# Patient Record
Sex: FEMALE
[De-identification: names, ages, dates, MRNs, and addresses within clinical notes are randomized per-mention and may not be internally consistent; named-entity substitution may affect disease eponyms.]

---

## 2017-04-20 ENCOUNTER — HOSPITAL ENCOUNTER (INPATIENT)
Dept: HOSPITAL 14 - H.ER | Age: 27
LOS: 1 days | Discharge: HOME | DRG: 883 | End: 2017-04-21
Attending: GENERAL ACUTE CARE HOSPITAL | Admitting: GENERAL ACUTE CARE HOSPITAL
Payer: COMMERCIAL

## 2017-04-20 DIAGNOSIS — K35.80: Primary | ICD-10-CM

## 2017-04-20 LAB
ALBUMIN/GLOB SERPL: 1.2 {RATIO} (ref 1–2.1)
ALP SERPL-CCNC: 70 U/L (ref 38–126)
ALT SERPL-CCNC: 68 U/L (ref 9–52)
AST SERPL-CCNC: 45 U/L (ref 14–36)
BASOPHILS # BLD AUTO: 0 K/UL (ref 0–0.2)
BASOPHILS NFR BLD: 0.2 % (ref 0–2)
BILIRUB SERPL-MCNC: 0.8 MG/DL (ref 0.2–1.3)
BUN SERPL-MCNC: 12 MG/DL (ref 7–17)
CALCIUM SERPL-MCNC: 9.3 MG/DL (ref 8.4–10.2)
CHLORIDE SERPL-SCNC: 104 MMOL/L (ref 98–107)
CO2 SERPL-SCNC: 25 MMOL/L (ref 22–30)
EOSINOPHIL # BLD AUTO: 0.1 K/UL (ref 0–0.7)
EOSINOPHIL NFR BLD: 1 % (ref 0–4)
ERYTHROCYTE [DISTWIDTH] IN BLOOD BY AUTOMATED COUNT: 13.1 % (ref 11.5–14.5)
GLOBULIN SER-MCNC: 3.5 GM/DL (ref 2.2–3.9)
GLUCOSE SERPL-MCNC: 79 MG/DL (ref 65–105)
HCT VFR BLD CALC: 38.1 % (ref 34–47)
LYMPHOCYTES # BLD AUTO: 2.2 K/UL (ref 1–4.3)
LYMPHOCYTES NFR BLD AUTO: 19.9 % (ref 20–40)
MCH RBC QN AUTO: 30.3 PG (ref 27–31)
MCHC RBC AUTO-ENTMCNC: 33.5 G/DL (ref 33–37)
MCV RBC AUTO: 90.5 FL (ref 81–99)
MONOCYTES # BLD: 0.8 K/UL (ref 0–0.8)
MONOCYTES NFR BLD: 7.4 % (ref 0–10)
NEUTROPHILS # BLD: 7.7 K/UL (ref 1.8–7)
NEUTROPHILS NFR BLD AUTO: 71.5 % (ref 50–75)
NRBC BLD AUTO-RTO: 0 % (ref 0–0)
PLATELET # BLD: 187 K/UL (ref 130–400)
PMV BLD AUTO: 8.1 FL (ref 7.2–11.7)
POTASSIUM SERPL-SCNC: 4.3 MMOL/L (ref 3.6–5)
PROT SERPL-MCNC: 7.8 G/DL (ref 6.3–8.2)
SODIUM SERPL-SCNC: 143 MMOL/L (ref 132–148)
WBC # BLD AUTO: 10.8 K/UL (ref 4.8–10.8)

## 2017-04-20 PROCEDURE — 0DTJ4ZZ RESECTION OF APPENDIX, PERCUTANEOUS ENDOSCOPIC APPROACH: ICD-10-PCS | Performed by: SURGERY

## 2017-04-20 RX ADMIN — WATER SCH MLS/HR: 1 INJECTION INTRAMUSCULAR; INTRAVENOUS; SUBCUTANEOUS at 22:03

## 2017-04-20 NOTE — CP.PCM.CON
<César Evans - Last Filed: 04/20/17 17:49>





History of Present Illness





- History of Present Illness


History of Present Illness: 


General Surgery Consult


Re: acute appendicitis





HPI: 27F presented to the ED C/O suprapubic abd pain that began today at 5AM. 

Pain continued to get worse through the day so she decided to come to ED. Had 

chills last night. Decreased appetite. Denies fevers, N/V/D. Last BM was 

yesterday and was normal. LMP 3/30/17. Currently c/o suprapubic/R sided pain.





PMH: Denies


PSH: L inguinal hernia as a child


SH: Occasional EtOH, no tobacco or EtOH use


All: NKDA


Meds: Denies








Review of Systems





- Review of Systems


All systems: reviewed and no additional remarkable complaints except (as per HPI

)





Past Patient History





- Past Social History


Smoking Status: Never Smoked





- PSYCHIATRIC


Hx Substance Use: No





- SURGICAL HISTORY


Hx Surgeries: No





- ANESTHESIA


Hx Anesthesia: No





Meds


Allergies/Adverse Reactions: 


 Allergies











Allergy/AdvReac Type Severity Reaction Status Date / Time


 


No Known Allergies Allergy   Verified 04/20/17 12:19














Physical Exam





- Constitutional


Appears: Non-toxic, No Acute Distress





- Head Exam


Head Exam: ATRAUMATIC, NORMOCEPHALIC





- Eye Exam


Eye Exam: EOMI.  absent: Scleral icterus





- ENT Exam


ENT Exam: Mucous Membranes Dry


Additional comments: 


trachea midline





- Respiratory Exam


Respiratory Exam: Respiratory Distress, NORMAL BREATHING PATTERN





- Cardiovascular Exam


Cardiovascular Exam: RRR, +S1





- GI/Abdominal Exam


GI & Abdominal Exam: Guarding, Soft, Tenderness (suprapubic > lower quadrants).

  absent: Distended, Firm, Rigid


Additional comments: 


well healed scar in L inguinal region





- Rectal Exam


Rectal Exam: Deferred





- Extremities Exam


Extremities exam: Positive for: normal capillary refill, pedal pulses present.  

Negative for: calf tenderness





- Back Exam


Back exam: absent: CVA tenderness (L), CVA tenderness (R)





- Neurological Exam


Neurological exam: Alert, Oriented x3





- Psychiatric Exam


Psychiatric exam: Normal Affect, Normal Mood





- Skin


Skin Exam: Dry, Warm





Results





- Vital Signs


Recent Vital Signs: 


 Last Vital Signs











Temp  98.4 F   04/20/17 12:05


 


Pulse  78   04/20/17 12:05


 


Resp  18   04/20/17 12:05


 


BP  128/83   04/20/17 12:05


 


Pulse Ox  100   04/20/17 15:44














- Labs


Result Diagrams: 


 04/20/17 12:40





 04/20/17 12:40





- Imaging and Cardiology


  ** CT scan - abdomen


Status: Image reviewed by me, Report reviewed by me





  ** US - vaginal


Status: Image reviewed by me, Report reviewed by me





Assessment & Plan





- Assessment and Plan (Free Text)


Assessment: 


27F with acute appendicitis


Plan: 


IVF


Morphine


Zofran


Abx


To OR for Lap Appy





<Michael Prieto - Last Filed: 04/20/17 19:38>





History of Present Illness





- History of Present Illness


History of Present Illness: 


Patient was seen and examined at the bedside.  Agree with resident's note above





Meds





- Medications


Medications: 


 Current Medications





Hydromorphone HCl (Dilaudid)  0.5 mg IVP Q5M PRN


   PRN Reason: Pain, severe (8-10)


   Stop: 04/20/17 23:59


Sodium Chloride (Sodium Chloride 0.9%)  1,000 mls @ 100 mls/hr IV .Q10H ERICK


   Stop: 04/21/17 17:16


Piperacillin Sod/Tazobactam (Sod 3.375 gm/ Sodium Chloride)  100 mls @ 100 mls/

hr IVPB Q6 ERICK


Metoclopramide HCl (Reglan)  10 mg IVP ONCE PRN


   PRN Reason: Nausea/Vomiting


   Stop: 04/20/17 19:53


Morphine Sulfate (Morphine)  4 mg IVP Q4 PRN


   PRN Reason: Pain, moderate (4-7)


Morphine Sulfate (Morphine)  2 mg IVP Q4 PRN


   PRN Reason: Pain, moderate (4-7)


Ondansetron HCl (Zofran Inj)  4 mg IVP Q4 PRN


   PRN Reason: Nausea/Vomiting


Ondansetron HCl (Zofran Inj)  4 mg IVP ONCE PRN


   PRN Reason: Nausea/Vomiting


   Stop: 04/20/17 19:53











Results





- Vital Signs


Recent Vital Signs: 


 Last Vital Signs











Temp  98.3 F   04/20/17 16:57


 


Pulse  76   04/20/17 16:57


 


Resp  17   04/20/17 16:57


 


BP  112/73   04/20/17 16:57


 


Pulse Ox  100   04/20/17 16:52














- Labs


Result Diagrams: 


 04/20/17 12:40





 04/20/17 12:40

## 2017-04-20 NOTE — US
HISTORY:

RLQ pain



COMPARISON:

None available.



TECHNIQUE:

Transabdominal and endovaginal ultrasound examination of the pelvis 

was performed.



FINDINGS:



UTERUS:

Measures 7.7 x 3.5 x 2.1 cm. Normal in size and appearance. No 

fibroid or other mass lesion seen.



ENDOMETRIUM:

Measures 3.4 mm in diameter. Unremarkable. 



CERVIX:

No cervical abnormality identified.



RIGHT OVARY:

Measures 3.2 x 2.8 x 1.5 cm. No solid mass. Normal flow. Thick wall 

cystic lesion at the right adnexa measures 1.4 x 1.5 x 1.2 centimeter 

P



LEFT OVARY:

Measures 2.9 x 2.9 x 1.6 cm. No solid mass. Normal flow. 



FREE FLUID:

No significant free fluid noted.



OTHER FINDINGS:

None. 



IMPRESSION:

Thick wall cystic lesion at the right adnexa measures 1.5 centimeter.



Otherwise no evidence of acute pathology or suspicious lesion in the 

uterus and adnexa.

## 2017-04-20 NOTE — PCM.SURG1
Surgeon's Initial Post Op Note





- Surgeon's Notes


Surgeon: Ida


Assistant:  [id number]


Type of Anesthesia: General Endo


Pre-Operative Diagnosis: Acute appendicitis


Operative Findings: inflamed appendix


Post-Operative Diagnosis: Acute appendicitis


Operation Performed: Laparoscopic appendectomy


Specimen/Specimens Removed: appendix


Estimated Blood Loss: EBL {In ML}: 5


Blood Products Given: N/A


Drains Used: No Drains


Post-Op Condition: Good


Date of Surgery/Procedure: 04/20/17


Time of Surgery/Procedure: 18:15

## 2017-04-20 NOTE — CP.PCM.HP
History of Present Illness





- History of Present Illness


History of Present Illness: 


28 yo female with no significant PMH came in complaining of sharp pain since 

5am in the suprapubic area. Pain was non-radiating and continuous unaccompanied 

with nausea or vomiting. Patient denied dysuria, fever or chills.














Present on Admission





- Present on Admission


Any Indicators Present on Admission: No


History of DVT/PE: No


History of Uncontrolled Diabetes: No


Urinary Catheter: No


Decubitus Ulcer Present: No





Review of Systems





- Review of Systems


All systems: reviewed and no additional remarkable complaints except (aside 

from those mentioned above, 12 point system review were negative by me)





Past Patient History





- Past Social History


Smoking Status: Never Smoked


Chewing Tobacco Use: No


Cigar Use: No


Alcohol: None


Drugs: Denies





- CARDIAC


Hx Cardiac Disorders: No





- PULMONARY


Hx Respiratory Disorders: No





- NEUROLOGICAL


Hx Neurological Disorder: No





- HEENT


Hx HEENT Problems: No





- RENAL


Hx Chronic Kidney Disease: No





- ENDOCRINE/METABOLIC


Hx Endocrine Disorders: No





- HEMATOLOGICAL/ONCOLOGICAL


Hx Blood Disorders: No





- INTEGUMENTARY


Hx Dermatological Problems: No





- MUSCULOSKELETAL/RHEUMATOLOGICAL


Hx Musculoskeletal Disorders: No





- GASTROINTESTINAL


Hx Gastrointestinal Disorders: No





- GENITOURINARY/GYNECOLOGICAL


Hx Genitourinary Disorders: No





- PSYCHIATRIC


Hx Psychophysiologic Disorder: No


Hx Substance Use: No





- SURGICAL HISTORY


Hx Herniorrhaphy: Yes (left inguinal hernia repair, 15 yrs ago)





- ANESTHESIA


Hx Anesthesia: Yes


Hx Anesthesia Reactions: No





Meds


Allergies/Adverse Reactions: 


 Allergies











Allergy/AdvReac Type Severity Reaction Status Date / Time


 


No Known Allergies Allergy   Verified 04/20/17 12:19














Physical Exam





- Constitutional


Appears: No Acute Distress





- Head Exam


Head Exam: ATRAUMATIC





- Eye Exam


Eye Exam: absent: Scleral icterus





- ENT Exam


ENT Exam: Mucous Membranes Moist





- Neck Exam


Neck exam: Negative for: Meningismus





- Respiratory Exam


Respiratory Exam: absent: Rhonchi, Wheezes, Respiratory Distress





- Cardiovascular Exam


Cardiovascular Exam: REGULAR RHYTHM, +S1, +S2





- GI/Abdominal Exam


GI & Abdominal Exam: Soft.  absent: Tenderness





- Rectal Exam


Rectal Exam: Deferred





- Extremities Exam


Extremities exam: Negative for: pedal edema





- Back Exam


Back exam: absent: tenderness





- Neurological Exam


Neurological exam: Alert, Oriented x3





- Psychiatric Exam


Psychiatric exam: Normal Affect





- Skin


Skin Exam: Dry, Intact





Results





- Vital Signs


Recent Vital Signs: 





 Last Vital Signs











Temp  98.3 F   04/20/17 16:57


 


Pulse  76   04/20/17 16:57


 


Resp  17   04/20/17 16:57


 


BP  112/73   04/20/17 16:57


 


Pulse Ox  100   04/20/17 16:52














- Labs


Result Diagrams: 


 04/20/17 12:40





 04/20/17 12:40





Assessment & Plan


(1) Appendicitis


Status: Acute


Comment: place on observation in med/surg.  Zosyn 3.375gm IV q 6hrs.  Morphine 

2mg IV q 4hrs prn for pain.  keep NPO.  surgical consult with Dr Prieto

## 2017-04-20 NOTE — ED PDOC
HPI: Abdomen


Time Seen by Provider: 04/20/17 12:21


Chief Complaint (Nursing): Abdominal Pain


History Per: Patient (Sharp RLQ abd pain since this AM. No NVD. No dysuria or 

fever. )


Onset/Duration Of Symptoms: Days (1)


Current Symptoms Are (Timing): Still Present


Severity: Moderate


Pain Scale Rating Of: 4


Location Of Pain/Discomfort: RLQ


Quality Of Discomfort: Sharp


Associated Symptoms: denies: Fever, Nausea, Vomiting, Diarrhea, Urinary Symptoms


Exacerbating Factors: None


Alleviating Factors: None





Past Medical History


Vital Signs: 


 Last Vital Signs











Temp  98.4 F   04/20/17 12:05


 


Pulse  78   04/20/17 12:05


 


Resp  18   04/20/17 12:05


 


BP  128/83   04/20/17 12:05


 


Pulse Ox  100   04/20/17 12:39














- Medical History


PMH: No Chronic Diseases





- Family History


Family History: States: Unknown Family Hx





- Allergies


Allergies/Adverse Reactions: 


 Allergies











Allergy/AdvReac Type Severity Reaction Status Date / Time


 


No Known Allergies Allergy   Verified 04/20/17 12:19














Review of Systems


ROS Statement: Except As Marked, All Systems Reviewed And Found Negative


Gastrointestinal: Positive for: Abdominal Pain





Physical Exam





- Reviewed


Nursing Documentation Reviewed: Yes


Vital Signs Reviewed: Yes





- Physical Exam


Appears: Positive for: Non-toxic, No Acute Distress


Head Exam: Positive for: ATRAUMATIC, NORMAL INSPECTION, NORMOCEPHALIC


Skin: Positive for: Normal Color, Warm, DRY


Eye Exam: Positive for: EOMI, Normal appearance, PERRL


ENT: Positive for: Normal ENT Inspection


Neck: Positive for: Normal, Painless ROM


Cardiovascular/Chest: Positive for: Regular Rate, Rhythm


Respiratory: Positive for: CNT, Normal Breath Sounds


Gastrointestinal/Abdominal: Positive for: Bowel Sounds, Soft, Tenderness (RLQ)


Back: Positive for: Normal Inspection


Extremity: Positive for: Normal ROM


Neurologic/Psych: Positive for: Alert, Oriented





- Laboratory Results


Result Diagrams: 


 04/20/17 12:40





 04/20/17 12:40





- ECG


O2 Sat by Pulse Oximetry: 100





Disposition





- Clinical Impression


Clinical Impression: 


 Appendicitis





- Patient ED Disposition


Is Patient to be Admitted: Yes


Counseled Patient/Family Regarding: Studies Performed, Diagnosis, Need For 

Followup, Rx Given





- Disposition


Disposition Time: 15:44


Condition: FAIR





- Pt Status Changed To:


Hospital Disposition Of: Inpatient





- Admit Certification


Admit to Inpatient:: After my assessment, the patient will require 

hospitalization for at least two midnights.  This is because of the severity of 

symptoms shown, intensity of services needed, and/or the medical risk in this 

patient being treated as an outpatient.





- POA


Present On Arrival: None

## 2017-04-20 NOTE — OP
PROCEDURE DATE: 04/20/2017



PREOPERATIVE DIAGNOSIS:  Acute appendicitis.



POSTOPERATIVE DIAGNOSIS:  Acute appendicitis.



PROCEDURE:  Laparoscopic appendectomy.



SURGEON:  Michael Prieto MD



ANESTHESIA:  General with endotracheal intubation.



INTRAVENOUS FLUIDS:  Crystalloids.



ESTIMATED BLOOD LOSS:  5 mL.



INTRAOPERATIVE FINDINGS:  Acute appendicitis.



SPECIMEN:  Appendix.



BRIEF HISTORY:  The patient is a very pleasant 27-year-old female who comes to 
the hospital complaining of 1 day duration of lower abdominal pain.  Upon 
further investigation and a CT scan, the patient was found to have acute 
appendicitis.  All the risks and benefits of the procedure were explained to 
the patient and with the patient having a full understanding of all the risks 
and benefits involved, informed consent was obtained and patient was taken to 
the operating room.



DESCRIPTION OF PROCEDURE:  The patient was brought into the operating room and 
placed supine on the operating table.  Bilateral Flowtron boots were applied to 
patient's lower extremities.  After successful induction of anesthesia and 
successful endotracheal intubation by the anesthesia team, Vicente catheter was 
inserted into the patient's urinary bladder and subsequent to that, patient's 
abdomen was prepped with ChloraPrep stick and draped in the standard surgical 
fashion.  Prior to the beginning of the procedure, timeout was called in the 
room and everyone in the room were in agreement.  Using Veress needle, patient'
s abdomen was entered at the umbilicus and pneumoperitoneum was achieved with 
good opening pressures.  Once this was accomplished, using an 11 blade scalpel 
knife, approximately 5 mm incision was made in a longitudinal fashion in the 
umbilicus and subsequent to that, a 5 mm trocar was introduced into the patient'
s abdomen.  At that point in time, 5 mm 0-degree scope was introduced into the 
patient's abdomen and abdomen was inspected.  Our attention was turned to the 
lower mid abdomen.  Using an 11 blade scalpel knife, a 5 mm incision was made 
in a transverse fashion and subsequent to that, another 5 mm trocar was 
introduced into the patient's abdomen.  Then, attention was turned to the left 
lower quadrant of the patient's abdomen.  Using an 11 blade scalpel knife, a 1 
cm incision was made in transverse fashion and subsequent to that, 12 mm trocar 
was introduced into the patient's abdomen.  At that point in time, using 2 
graspers, appendix was mobilized and subsequent to that, the window was created 
between the appendix and the mesoappendix with the [place] dissector.  Once 
this was accomplished, mesoappendix was taken with a gray load 45 mm Endo-LUCITA 
stapler and once that was accomplished, appendix was taken with the blue load 
45 mm Endo-LUCITA stapler.  Once the appendix was completely freed up, EndoCatch 
bag was introduced into the patient's abdomen, appendix was placed inside of 
the bag and the bag was closed.  At that point in time, staple line was 
inspected for hemostasis.  Hemostasis was confirmed.  The 12 mm trocar together 
with EndoCatch bag and appendix were removed from the patient's abdomen and 
passed off to the [facility] as a specimen.  Fascial layer at the left lower 
quadrant was closed with 1 interrupted 0 Vicryl suture on UR-6 needle and 
subsequent to that, patient's abdomen was fully desufflated.  The rest of the 
trocars were removed from the patient's abdomen and skin was closed with 4-0 
Monocryl suture in a running subcuticular fashion.  At the end of the procedure
, incision sites were infiltrated with Marcaine anesthetic.  The patient's 
abdomen was washed and dried and a Dermabond was applied to the skin.  The 
patient was successfully extubated by the anesthesia team.  Vicente catheter was 
removed and the patient was transferred to the stretcher and taken to the 
recovery room in a stable condition.  At the end of the procedure, all 
instrument counts, needles and sponges were correct.





__________________________________________

Michael Prieto MD







cc:   



DD: 04/20/2017 19:44:11  [id number]

TT: 04/20/2017 20:13:29

Job # [id number]

en

MTDD

## 2017-04-20 NOTE — CT
PROCEDURE:  CT Abdomen and Pelvis with contrast



HISTORY:

Abdominal pain. Duration of symptoms and precise location unknown. 



COMPARISON:

None.



TECHNIQUE:

Contrast dose: 95 cc Omnipaque 300



Radiation dose:



Total exam DLP = 327.48 mGy-cm.



This CT exam was performed using one or more of the following dose 

reduction techniques: Automated exposure control, adjustment of the 

mA and/or kV according to patient size, and/or use of iterative 

reconstruction technique.



FINDINGS:



LOWER THORAX:

Unremarkable. 



LIVER:

Unremarkable. No gross lesion or ductal dilatation. 



GALLBLADDER AND BILE DUCTS:

Unremarkable. 



PANCREAS:

Unremarkable. No gross lesion or ductal dilatation.



SPLEEN:

Unremarkable. 



ADRENALS:

Unremarkable. No mass. 



KIDNEYS AND URETERS:

Unremarkable. No hydronephrosis. No solid mass. 



VASCULATURE:

Unremarkable. No aortic aneurysm. 



BOWEL:

Unremarkable. No obstruction. No gross mural thickening. 



APPENDIX:

Thickening of the wall of the appendix, contrast-enhancing 

characteristics reflecting early/acute appendicitis.  No loculated 

air, free air or drainable collection in the right lower quadrant or 

pelvis. Please refer to axial series 3 105-108. 



PERITONEUM:

Unremarkable. No free fluid. No free air. 



LYMPH NODES:

Unremarkable. No enlarged lymph nodes. 



BLADDER:

Unremarkable. 



REPRODUCTIVE:

Unremarkable uterus.  Bilateral follicular/ adnexal cysts. 



BONES:

No acute fracture. 



OTHER FINDINGS:

None.



IMPRESSION:

Acute appendicitis, uncomplicated.



Communication of results: I discussed findings directly with the 

attending physician in the emergency room Dr. Anderson.  Study 

completed at 15:10.  Verbal results provided 15:39 April 20, 2017.

## 2017-04-21 VITALS
SYSTOLIC BLOOD PRESSURE: 102 MMHG | DIASTOLIC BLOOD PRESSURE: 61 MMHG | HEART RATE: 71 BPM | RESPIRATION RATE: 20 BRPM | TEMPERATURE: 98.2 F

## 2017-04-21 VITALS — OXYGEN SATURATION: 99 %

## 2017-04-21 LAB
BASOPHILS # BLD AUTO: 0 K/UL (ref 0–0.2)
BASOPHILS NFR BLD: 0.1 % (ref 0–2)
BUN SERPL-MCNC: 9 MG/DL (ref 7–17)
CALCIUM SERPL-MCNC: 9.3 MG/DL (ref 8.4–10.2)
CHLORIDE SERPL-SCNC: 104 MMOL/L (ref 98–107)
CO2 SERPL-SCNC: 23 MMOL/L (ref 22–30)
EOSINOPHIL # BLD AUTO: 0 K/UL (ref 0–0.7)
EOSINOPHIL NFR BLD: 0 % (ref 0–4)
ERYTHROCYTE [DISTWIDTH] IN BLOOD BY AUTOMATED COUNT: 12.9 % (ref 11.5–14.5)
GLUCOSE SERPL-MCNC: 132 MG/DL (ref 65–105)
HCT VFR BLD CALC: 35 % (ref 34–47)
LYMPHOCYTES # BLD AUTO: 0.5 K/UL (ref 1–4.3)
LYMPHOCYTES NFR BLD AUTO: 9.1 % (ref 20–40)
MCH RBC QN AUTO: 30 PG (ref 27–31)
MCHC RBC AUTO-ENTMCNC: 33 G/DL (ref 33–37)
MCV RBC AUTO: 90.9 FL (ref 81–99)
MONOCYTES # BLD: 0.2 K/UL (ref 0–0.8)
MONOCYTES NFR BLD: 2.6 % (ref 0–10)
NEUTROPHILS # BLD: 5.2 K/UL (ref 1.8–7)
NEUTROPHILS NFR BLD AUTO: 86 % (ref 42–75)
NEUTROPHILS NFR BLD AUTO: 88.2 % (ref 50–75)
NRBC BLD AUTO-RTO: 0.1 % (ref 0–0)
PLATELET # BLD: 177 K/UL (ref 130–400)
PMV BLD AUTO: 8.6 FL (ref 7.2–11.7)
POTASSIUM SERPL-SCNC: 4.7 MMOL/L (ref 3.6–5)
SODIUM SERPL-SCNC: 136 MMOL/L (ref 132–148)
TOTAL CELLS COUNTED BLD: 100
WBC # BLD AUTO: 5.9 K/UL (ref 4.8–10.8)

## 2017-04-21 RX ADMIN — WATER SCH MLS/HR: 1 INJECTION INTRAMUSCULAR; INTRAVENOUS; SUBCUTANEOUS at 03:56

## 2017-04-21 NOTE — CP.PCM.PN
Subjective





- Date & Time of Evaluation


Date of Evaluation: 04/21/17


Time of Evaluation: 09:07





- Subjective


Subjective: 


General Surgery - Dr. Prieto





Pt S&E.  RENA.  Pt states her pain is much improved after surgery yesterday.  

She is tolerating regular diet, ambulating, no fevers/chills, nausea/vomiting, 

SOB/CP.





Objective





- Vital Signs/Intake and Output


Vital Signs (last 24 hours): 


 











Temp Pulse Resp BP Pulse Ox


 


 98.2 F   71   20   102/61   99 


 


 04/21/17 07:31  04/21/17 07:31  04/21/17 07:31  04/21/17 07:31  04/21/17 07:31








Intake and Output: 


 











 04/21/17 04/21/17





 06:59 18:59


 


Intake Total 150 


 


Balance 150 














- Medications


Medications: 


 Current Medications





Sodium Chloride (Sodium Chloride 0.9%)  1,000 mls @ 100 mls/hr IV .Q10H ERICK


   Stop: 04/21/17 17:16


   Last Admin: 04/21/17 03:15 Dose:  Not Given


Piperacillin Sod/Tazobactam (Sod 3.375 gm/ Sodium Chloride)  100 mls @ 100 mls/

hr IVPB Q6 ERICK


   Last Admin: 04/21/17 03:56 Dose:  100 mls/hr


Morphine Sulfate (Morphine)  4 mg IVP Q4 PRN


   PRN Reason: Pain, moderate (4-7)


Morphine Sulfate (Morphine)  2 mg IVP Q4 PRN


   PRN Reason: Pain, moderate (4-7)


   Last Admin: 04/20/17 22:08 Dose:  2 mg


Ondansetron HCl (Zofran Inj)  4 mg IVP Q4 PRN


   PRN Reason: Nausea/Vomiting











- Labs


Labs: 


 





 04/21/17 06:44 





 04/21/17 06:44 











- Constitutional


Appears: No Acute Distress





- Head Exam


Head Exam: ATRAUMATIC, NORMOCEPHALIC





- Respiratory Exam


Respiratory Exam: NORMAL BREATHING PATTERN.  absent: Respiratory Distress





- GI/Abdominal Exam


GI & Abdominal Exam: Soft.  absent: Distended, Guarding, Tenderness, Rebound


Additional comments: 


surgical incisions C/D/I with dermabond





- Neurological Exam


Neurological Exam: Alert, Oriented x3





- Psychiatric Exam


Psychiatric exam: Normal Affect, Normal Mood





- Skin


Skin Exam: Dry, Intact





Assessment and Plan





- Assessment and Plan (Free Text)


Assessment: 


28yo F s/p laparoscopic Appendectomy, POD #1





-Clear for discharge from surgical standpoint


-Pt may resume regular diet and light activities, avoid any heavy lifting >

10lbs for 4 weeks.  Pt may take Motrin or Tyenol as needed for pain.  She may 

shower and wash with soap and water, do not remove dermabond glue and no 

bathing or swimming.  F/U in office with Dr. Prieto in 2 weeks.





DW Dr. Ida Hoang PGY2

## 2018-10-09 ENCOUNTER — HOSPITAL ENCOUNTER (EMERGENCY)
Dept: HOSPITAL 14 - H.ER | Age: 28
Discharge: HOME | End: 2018-10-09
Payer: SELF-PAY

## 2018-10-09 VITALS
HEART RATE: 77 BPM | OXYGEN SATURATION: 100 % | TEMPERATURE: 99 F | SYSTOLIC BLOOD PRESSURE: 118 MMHG | DIASTOLIC BLOOD PRESSURE: 75 MMHG | RESPIRATION RATE: 20 BRPM
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
## 2018-10-09 DIAGNOSIS — L03.90: Primary | ICD-10-CM

## 2018-10-09 NOTE — ED PDOC
HPI: Skin/Bite Injury


Time Seen by Provider: 10/09/18 13:36


Chief Complaint (Nursing): Bite


Chief Complaint (Provider): Bite


History Per: Patient,  ([id number])


History/Exam Limitations: no limitations


Onset/Duration Of Symptoms: Days (x2 days ago )


Additional Complaint(s): 





Sharifa Whiting is a 28 year old female with no past medical 

history, who presents to the emergency department with bug bites to her upper 

back, left and right forearm, and left hand, onset x2 days ago. She reports the 

redness on her forearms has worsened since yesterday. Patient further states she

lives and sleeps in the same bed as her partner who does not share symptoms. She

reports not developing any new rashes and denies having a fever or any other 

medical complaint. 





PMD:  Juan Griffiths





Past Medical History


Reviewed: Historical Data, Nursing Documentation, Vital Signs


Vital Signs: 





                                Last Vital Signs











Temp  99.0 F   10/09/18 13:10


 


Pulse  77   10/09/18 13:10


 


Resp  20   10/09/18 13:10


 


BP  118/75   10/09/18 13:10


 


Pulse Ox  100   10/09/18 13:10














- Medical History


PMH: No Chronic Diseases


   Denies: Chronic Kidney Disease





- Surgical History


Other surgeries: Left inguinal hernia repair (2003)





- Family History


Family History: States: Unknown Family Hx





- Home Medications


Home Medications: 


                                Ambulatory Orders











 Medication  Instructions  Recorded


 


Amoxicillin/Clavulanate [Augmentin 1 tab PO BID #10 tab 04/21/17





875 MG-125 MG]  


 


RX: traMADol [Ultram] 50 mg PO TID PRN #15 tab 04/21/17


 


Cephalexin [cephalexin] 500 mg PO Q6 #28 cap 10/09/18














- Allergies


Allergies/Adverse Reactions: 


                                    Allergies











Allergy/AdvReac Type Severity Reaction Status Date / Time


 


methocarbamol Allergy  NAUSEA Verified 10/09/18 13:08














Review of Systems


ROS Statement: Except As Marked, All Systems Reviewed And Found Negative


Constitutional: Negative for: Fever


Skin: Positive for: Other (bug bites to upper back, left and right forearm, left

 hand)





Physical Exam





- Reviewed


Nursing Documentation Reviewed: Yes


Vital Signs Reviewed: Yes





- Physical Exam


Appears: Positive for: Well, No Acute Distress


Head Exam: Positive for: ATRAUMATIC, NORMOCEPHALIC


Skin: Negative for: Normal Color (erythematous papules on the upper back, left 

and right forearm, and left hand)


Eye Exam: Positive for: Normal appearance


Cardiovascular/Chest: Positive for: Regular Rate, Rhythm.  Negative for: Murmur


Respiratory: Positive for: Normal Breath Sounds.  Negative for: Respiratory 

Distress


Back: Positive for: Normal Inspection.  Negative for: L CVA Tenderness, R CVA 

Tenderness, Vertebral Tenderness


Extremity: Positive for: Normal ROM, Other (right forearm and dorsal left hand 

has moderate surrounding erythema without breaking skin integrity, streaking 

vesicles, or pustules).  Negative for: Tenderness, Deformity, Swelling


Neurologic/Psych: Positive for: Alert, Oriented (x3)





- ECG


O2 Sat by Pulse Oximetry: 100 (RA)


Pulse Ox Interpretation: Normal





- Progress


ED Course And Treament: 





Provider advised patient to apply hydrocortisone to rash except to the right 

forearm and left hand area and take prescribed medications. Provider also 

advised patient to follow up with [facility] but return to 

emergency department if fever or redness worsens. 





Medical Decision Making


Medical Decision Making: 





Initial Time: 13:36


Initial Plan:





Provider advised patient to apply hydrocortisone to rash except to the right 

forearm and left hand area and take prescribed medication. Provider also advised

 patient to follow up with [facility] but return to emergency department if 

fever or redness worsens. 


 

--------------------------------------------------------------------------------


------------------------------------------------------   


Scribe Attestation:


Documented by Bright Davila, acting as a scribe for Braden REYES





Provider Scribe Attestation:


All medical record entries made by the Scribe were at my direction and 

personally dictated by me. I have reviewed the chart and agree that the record 

accurately reflects my personal performance of the history, physical exam, 

medical decision making, and the department course for this patient. I have also

 personally directed, reviewed, and agree with the discharge instructions and 

disposition.





Disposition





- Clinical Impression


Clinical Impression: 


 Insect bite, Cellulitis








- Patient ED Disposition


Is Patient to be Admitted: No





- Disposition


Referrals: 


[facility] [Outside]


Disposition: Routine/Home


Disposition Time: 14:57


Condition: STABLE


Additional Instructions: 





SHARIFA WHITING, thank you for letting us take care of you today. 

Your provider was Bird Thompson III, DO and you were treated for BITE. The 

emergency medical care you received today was directed at your acute symptoms. 

If you were prescribed any medication, please fill it and take as directed. It 

may take several days for your symptoms to resolve. Return to the Emergency 

Department if your symptoms worsen, do not improve, or if you have any other 

problems.





Please contact your doctor or call one of the physicians/clinics you have been 

referred to that are listed on the Patient Visit Information form that is 

included in your discharge packet. Bring any paperwork you were given at 

discharge with you along with any medications you are taking to your follow up 

visit. Our treatment cannot replace ongoing medical care by a primary care 

provider outside of the emergency department.





Thank you for allowing the [facility] team to be part of your care today.








If you had an X-Ray or CT scan: A Radiologist will review the ED reading if any 

change in treatment is needed we will contact you.***





If you had a blood, urine, or wound culture: It will take several days for the 

results, if any change in treatment is needed we will contact you.***





If you had an STI test: It will take 48 hours for the results. Please call after

 1 week if you have not heard back.***


Prescriptions: 


Cephalexin [cephalexin] 500 mg PO Q6 #28 cap


Instructions:  Insect Bites and Stings (DC), Cellulitis (Skin Infection), Adult 

(DC)


Forms:  [employer] (Persian)


Print Language: Korean